# Patient Record
Sex: FEMALE | Race: WHITE
[De-identification: names, ages, dates, MRNs, and addresses within clinical notes are randomized per-mention and may not be internally consistent; named-entity substitution may affect disease eponyms.]

---

## 2019-01-28 ENCOUNTER — HOSPITAL ENCOUNTER (OUTPATIENT)
Dept: HOSPITAL 74 - JMAMMO-SUR | Age: 29
Discharge: HOME | End: 2019-01-28
Attending: OBSTETRICS & GYNECOLOGY
Payer: COMMERCIAL

## 2019-01-28 DIAGNOSIS — N60.32: Primary | ICD-10-CM

## 2019-01-28 PROCEDURE — 0HBU3ZX EXCISION OF LEFT BREAST, PERCUTANEOUS APPROACH, DIAGNOSTIC: ICD-10-PCS

## 2019-01-28 PROCEDURE — A4648 IMPLANTABLE TISSUE MARKER: HCPCS

## 2019-01-29 NOTE — PATH
Surgical Pathology Report



Patient Name:  PAWEL OGLDMAN

Accession #:  

[facility]. Rec. #:  [id number]                                                        

   /Age/Gender:  1990 (Age: 28) / F

Account:  [id number]                                                          

             Location: RADIOLOGY

Taken:  2019

Received:  2019

Reported:  2019

Physicians:  SAI Hurtado M.D.

  



Specimen(s) Received

 LEFT BREAST 12:00 





Clinical History

4.8 cm solid mass left breast 12:00







Final Diagnosis

BREAST, LEFT, 12:00, ULTRASOUND GUIDED CORE BIOPSY:

BENIGN BREAST PARENCHYMA WITH DENSE STROMAL FIBROSIS.



Comment: Suggest clinical and radiologic correlation. 





***Electronically Signed***

Joi David M.D.





Gross Description

Received in formalin labeled "left 12:00," are 3 tan-yellow, cylindrical

portions of fibroadipose tissue averaging 0.8 cm in length and 0.1 cm in

diameter. The specimens are submitted in toto in one cassette.



Time to formalin fixation: Less than one minute

Total formalin fixation time: Approximately 9 hours.

/2019



[facility]